# Patient Record
Sex: MALE | Race: BLACK OR AFRICAN AMERICAN | Employment: FULL TIME | URBAN - METROPOLITAN AREA
[De-identification: names, ages, dates, MRNs, and addresses within clinical notes are randomized per-mention and may not be internally consistent; named-entity substitution may affect disease eponyms.]

---

## 2019-09-27 ENCOUNTER — HOSPITAL ENCOUNTER (EMERGENCY)
Facility: CLINIC | Age: 54
Discharge: HOME | End: 2019-09-27
Attending: EMERGENCY MEDICINE | Admitting: EMERGENCY MEDICINE
Payer: COMMERCIAL

## 2019-09-27 VITALS
DIASTOLIC BLOOD PRESSURE: 82 MMHG | RESPIRATION RATE: 16 BRPM | HEIGHT: 72 IN | SYSTOLIC BLOOD PRESSURE: 120 MMHG | BODY MASS INDEX: 25.38 KG/M2 | WEIGHT: 187.39 LBS | OXYGEN SATURATION: 100 % | HEART RATE: 74 BPM

## 2019-09-27 DIAGNOSIS — T78.3XXA ANGIOEDEMA, INITIAL ENCOUNTER: ICD-10-CM

## 2019-09-27 PROCEDURE — 96366 THER/PROPH/DIAG IV INF ADDON: CPT | Performed by: EMERGENCY MEDICINE

## 2019-09-27 PROCEDURE — 99284 EMERGENCY DEPT VISIT MOD MDM: CPT | Mod: Z6 | Performed by: EMERGENCY MEDICINE

## 2019-09-27 PROCEDURE — 96365 THER/PROPH/DIAG IV INF INIT: CPT | Performed by: EMERGENCY MEDICINE

## 2019-09-27 PROCEDURE — 25000128 H RX IP 250 OP 636: Performed by: EMERGENCY MEDICINE

## 2019-09-27 PROCEDURE — 99284 EMERGENCY DEPT VISIT MOD MDM: CPT | Mod: 25 | Performed by: EMERGENCY MEDICINE

## 2019-09-27 PROCEDURE — 96375 TX/PRO/DX INJ NEW DRUG ADDON: CPT | Performed by: EMERGENCY MEDICINE

## 2019-09-27 RX ORDER — PREDNISONE 20 MG/1
TABLET ORAL
Qty: 10 TABLET | Refills: 0 | Status: SHIPPED | OUTPATIENT
Start: 2019-09-27

## 2019-09-27 RX ORDER — EPINEPHRINE 0.3 MG/.3ML
0.3 INJECTION SUBCUTANEOUS
Qty: 1 EACH | Refills: 0 | Status: SHIPPED | OUTPATIENT
Start: 2019-09-27

## 2019-09-27 RX ORDER — DIPHENHYDRAMINE HYDROCHLORIDE 50 MG/ML
50 INJECTION INTRAMUSCULAR; INTRAVENOUS ONCE
Status: COMPLETED | OUTPATIENT
Start: 2019-09-27 | End: 2019-09-27

## 2019-09-27 RX ORDER — METHYLPREDNISOLONE SODIUM SUCCINATE 125 MG/2ML
125 INJECTION, POWDER, LYOPHILIZED, FOR SOLUTION INTRAMUSCULAR; INTRAVENOUS ONCE
Status: COMPLETED | OUTPATIENT
Start: 2019-09-27 | End: 2019-09-27

## 2019-09-27 RX ORDER — KETOROLAC TROMETHAMINE 30 MG/ML
30 INJECTION, SOLUTION INTRAMUSCULAR; INTRAVENOUS ONCE
Status: COMPLETED | OUTPATIENT
Start: 2019-09-27 | End: 2019-09-27

## 2019-09-27 RX ORDER — IBUPROFEN 800 MG/1
800 TABLET, FILM COATED ORAL EVERY 8 HOURS PRN
Qty: 60 TABLET | Refills: 0 | Status: SHIPPED | OUTPATIENT
Start: 2019-09-27 | End: 2019-10-05

## 2019-09-27 RX ORDER — FAMOTIDINE 20 MG/1
20 TABLET, FILM COATED ORAL 2 TIMES DAILY PRN
Qty: 30 TABLET | Refills: 0 | Status: SHIPPED | OUTPATIENT
Start: 2019-09-27

## 2019-09-27 RX ADMIN — DIPHENHYDRAMINE HYDROCHLORIDE 50 MG: 50 INJECTION, SOLUTION INTRAMUSCULAR; INTRAVENOUS at 10:23

## 2019-09-27 RX ADMIN — KETOROLAC TROMETHAMINE 30 MG: 30 INJECTION, SOLUTION INTRAMUSCULAR at 10:25

## 2019-09-27 RX ADMIN — FAMOTIDINE 20 MG: 20 INJECTION, SOLUTION INTRAVENOUS at 10:23

## 2019-09-27 RX ADMIN — METHYLPREDNISOLONE SODIUM SUCCINATE 125 MG: 125 INJECTION, POWDER, FOR SOLUTION INTRAMUSCULAR; INTRAVENOUS at 10:23

## 2019-09-27 ASSESSMENT — MIFFLIN-ST. JEOR: SCORE: 1728

## 2019-09-27 NOTE — ED TRIAGE NOTES
3 days ago pt had a swollen L lip after eating but denies eating anything new. Last night pt ate at the same place, no new foods, and when he woke up this morning he had a swollen tongue.  Pt took 25 mg of benadryl at 0730 and went to the [facility].  They gave him 0.5 mg of IM epi at 0835 called EMS and was sent here.  EMS gave another 0.5 mg of epi at 0900.  Pt states he has some relief but his tongue is still swollen.  Pt also states he has pain in his throat and tongue.  Pt can control his secretions.  Pt is not on an ACE inhibitor.

## 2019-09-27 NOTE — ED PROVIDER NOTES
History     Chief Complaint   Patient presents with     Allergic Reaction     HPI  Deb Britton is a 54 year old male with PMH of GERD who presents to the emergency department with chief complaint of allergic reaction.  The patient reports that 3 days ago he experienced a swollen lip after eating.  He denies any new foods.  The patient notes that he ate at the same place last night again, again no new foods, and when he woke up this morning his tongue was swollen.  This restaurant is not new to him.  The patient took 25 mg of Benadryl at 730 and went to the [facility].  Patient was given 0.5 mg of IM epi at 8:35 AM.  He was sent here via EMS.  EMS gave an additional 0.5 mg of epinephrine at 900.  The patient states that his symptoms have improved, but he still has some tongue swelling.  Patient also complains of pain in his tongue and throat.  He is able to swallow.  Patient does not take any ACE inhibitors.  No family history of angioedema.  No known allergies.  No rash or pruritus.    I have reviewed the Medications, Allergies, Past Medical and Surgical History, and Social History in the Epic system.    Review of Systems   General: No fevers or chills  Skin: No rash or diaphoresis  Eyes: No eye redness or discharge  Ears/Nose/Throat: No rhinorrhea or nasal congestion, positive for tongue swelling  Respiratory: No cough or SOB  Cardiovascular: No chest pain or palpitations  Gastrointestinal: No nausea, vomiting, or diarrhea  Genitourinary: No urinary frequency, hematuria, or dysuria  Musculoskeletal: No arthralgias or myalgias  Neurologic: No numbness or weakness  Psychiatric: No depression or SI  Hematologic/Lymphatic/Immunologic: No leg swelling, no easy bruising/bleeding  Endocrine: No polyuria/polydypsia      Physical Exam          Physical Exam    General: Well nourished, well developed, NAD, patient is able to swallow his own secretions without difficulty  HEENT: EOMI, anicteric. NCAT, MMM, tongue  swelling and muffled voice secondary to this, posterior oropharynx is normal as visualized.  Airway is patent.  Neck: no jugular venous distension, supple, nl ROM, no stridor  Cardiac: Regular rate and rhythm. No murmurs, rubs, or gallops. Normal S1, S2.  Intact peripheral pulses  Pulm: CTAB, no stridor, wheezes, rales, or rhonchi  Skin: Warm and dry to the touch.  No rash  Extremities: No LE edema, no cyanosis, w/w/p  Neuro: A&Ox3, no gross focal deficits        ED Course        Procedures             Critical Care time:  none             Labs Ordered and Resulted from Time of ED Arrival Up to the Time of Departure from the ED - No data to display         Assessments & Plan (with Medical Decision Making)   The pt is a 54 yom who presents to the emergency department with chief complaint of allergic reaction.  Patient with tongue swelling on exam.  Differential diagnosis includes angioedema secondary to allergic reaction or hereditary angioedema.  Patient does not have a known family history of angioedema.  The patient is not on ACE inhibitors.  No specific known exposures.  Patient was given Solu-Medrol, Benadryl, Pepcid IV.  He was also given Toradol to treat his pain.    I have reviewed the nursing notes.    I have reviewed the findings, diagnosis, plan and need for follow up with the patient.  1130 patient was reevaluated, he feels that his symptoms have been improving.  Tongue remains swollen, but swelling has decreased.  Airway remains patent.  Patient noted to ambulate to the restroom with steady gait.  1320 patient reevaluated.  Some tongue swelling remains, however it has continued to improve.  Patient speech is somewhat clearer.  Patient feels better at this time.  Discussed with patient option of continued observation/placement in the ER observation unit versus discharge home with patient to return to the emergency department if his symptoms worsen. Patient feels comfortable going home at this time.   Patient to be prescribed a course of steroids, Pepcid, and Benadryl as well as EpiPen to use if symptoms worsen.  Patient also prescribed ibuprofen to treat pain.  Patient advised to return to the emergency department immediately if his symptoms worsen.  Patient agrees with plan.  Patient to be discharged home. Advised to follow up with PCP within 1 week. To return to ER immediately with any new/worsening symptoms.    Of note, after the patient left the emergency department I was notified by his RN that he had expressed unhappiness with his care to her.  The patient did not express any such concerns to me when I discussed his discharge instructions with him.  Patient had expressed frustration that we did not do any testing to find out exactly why he had experienced these episodes of swelling.  Patient specifically expressed frustration that we had not checked a urine sample on him.  Patient did not at any time expressed these concerns with me and he had left the emergency department by the time I was notified of these concerns, so I was not able to discuss with the patient that there is no specific testing that we typically perform in the emergency department to determine the etiology of his angioedema.  Patient was advised to follow-up with his PCP for further work-up, which may include allergy testing.    New Prescriptions    No medications on file       Final diagnoses:   Angioedema, initial encounter       9/27/2019   [facility], [place], EMERGENCY DEPARTMENT     Verónica Savage MD  09/27/19 1268       Verónica Savage MD  09/27/19 [id number]

## 2019-09-27 NOTE — DISCHARGE INSTRUCTIONS
TODAY'S VISIT:  You were seen today for tongue swelling  -   - If you had any labs or imaging/radiology tests performed today, you should also discuss these tests with your usual provider.     FOLLOW-UP:  Please make an appointment to follow up with:  - Your Primary Care Provider in 7 days.    - Have your provider review the results from today's visit with you again to make sure no further follow-up or additional testing is needed based on those results.     PRESCRIPTIONS / MEDICATIONS:  - prednisone, benadryl, pepcid, epi-pen, ibuprofen    RETURN TO THE EMERGENCY DEPARTMENT  Return to the Emergency Department at any time for any new or worsening symptoms or any concerns.

## 2019-09-27 NOTE — ED NOTES
"Discharge instructions reviewed, pt states he is unhappy with care because MD \"did not investigate what is causing the problem\". Pt states breathing controlled and tongue swelling improving. Discharge meds reviewed, pt states \"I am not going to take those\", pt educated on use of medications and emergency management for anaphylaxis. Pt continues to be upset, number for patient relations provided.   "

## 2019-09-27 NOTE — ED NOTES
Bed: ED02  Expected date:   Expected time:   Means of arrival:   Comments:  [facility]  84 m   Allergic reaction

## 2019-09-27 NOTE — ED AVS SNAPSHOT
[facility], [place], Emergency Department  [address] [place] [id number]  Phone:  [phone]                                    Deb Britton   MRN: [id number]    Department:  [facility], Emergency Department   Date of Visit:  9/27/2019           After Visit Summary Signature Page    I have received my discharge instructions, and my questions have been answered. I have discussed any challenges I see with this plan with the nurse or doctor.    ..........................................................................................................................................  Patient/Patient Representative Signature      ..........................................................................................................................................  Patient Representative Print Name and Relationship to Patient    ..................................................               ................................................  Date                                   Time    ..........................................................................................................................................  Reviewed by Signature/Title    ...................................................              ..............................................  Date                                               Time          22EPIC Rev 08/18